# Patient Record
Sex: MALE | Race: WHITE | NOT HISPANIC OR LATINO | URBAN - METROPOLITAN AREA
[De-identification: names, ages, dates, MRNs, and addresses within clinical notes are randomized per-mention and may not be internally consistent; named-entity substitution may affect disease eponyms.]

---

## 2017-02-23 ENCOUNTER — INPATIENT (INPATIENT)
Facility: HOSPITAL | Age: 57
LOS: 0 days | Discharge: HOME | DRG: 74 | End: 2017-02-24
Attending: HOSPITALIST | Admitting: FAMILY MEDICINE
Payer: COMMERCIAL

## 2017-02-23 VITALS
HEIGHT: 68 IN | DIASTOLIC BLOOD PRESSURE: 99 MMHG | HEART RATE: 92 BPM | OXYGEN SATURATION: 98 % | WEIGHT: 184.97 LBS | RESPIRATION RATE: 20 BRPM | SYSTOLIC BLOOD PRESSURE: 166 MMHG | TEMPERATURE: 98 F

## 2017-02-23 DIAGNOSIS — I10 ESSENTIAL (PRIMARY) HYPERTENSION: ICD-10-CM

## 2017-02-23 DIAGNOSIS — G45.9 TRANSIENT CEREBRAL ISCHEMIC ATTACK, UNSPECIFIED: ICD-10-CM

## 2017-02-23 DIAGNOSIS — Z98.890 OTHER SPECIFIED POSTPROCEDURAL STATES: Chronic | ICD-10-CM

## 2017-02-23 DIAGNOSIS — E78.5 HYPERLIPIDEMIA, UNSPECIFIED: ICD-10-CM

## 2017-02-23 LAB
ALBUMIN SERPL ELPH-MCNC: 4.7 G/DL — SIGNIFICANT CHANGE UP (ref 3.3–5.2)
ALP SERPL-CCNC: 68 U/L — SIGNIFICANT CHANGE UP (ref 40–120)
ALT FLD-CCNC: 22 U/L — SIGNIFICANT CHANGE UP
ANION GAP SERPL CALC-SCNC: 19 MMOL/L — HIGH (ref 5–17)
ANISOCYTOSIS BLD QL: SLIGHT — SIGNIFICANT CHANGE UP
APPEARANCE UR: CLEAR — SIGNIFICANT CHANGE UP
APTT BLD: 32.1 SEC — SIGNIFICANT CHANGE UP (ref 27.5–37.4)
AST SERPL-CCNC: 27 U/L — SIGNIFICANT CHANGE UP
BASOPHILS NFR BLD AUTO: 1 % — SIGNIFICANT CHANGE UP (ref 0–2)
BILIRUB SERPL-MCNC: 0.7 MG/DL — SIGNIFICANT CHANGE UP (ref 0.4–2)
BILIRUB UR-MCNC: NEGATIVE — SIGNIFICANT CHANGE UP
BUN SERPL-MCNC: 21 MG/DL — HIGH (ref 8–20)
CALCIUM SERPL-MCNC: 10.4 MG/DL — HIGH (ref 8.6–10.2)
CHLORIDE SERPL-SCNC: 101 MMOL/L — SIGNIFICANT CHANGE UP (ref 98–107)
CO2 SERPL-SCNC: 21 MMOL/L — LOW (ref 22–29)
COLOR SPEC: SIGNIFICANT CHANGE UP
CREAT SERPL-MCNC: 1.2 MG/DL — SIGNIFICANT CHANGE UP (ref 0.5–1.3)
DIFF PNL FLD: NEGATIVE — SIGNIFICANT CHANGE UP
GLUCOSE SERPL-MCNC: 81 MG/DL — SIGNIFICANT CHANGE UP (ref 70–115)
GLUCOSE UR QL: NEGATIVE MG/DL — SIGNIFICANT CHANGE UP
HCT VFR BLD CALC: 39.2 % — LOW (ref 42–52)
HGB BLD-MCNC: 13.7 G/DL — LOW (ref 14–18)
INR BLD: 1.03 RATIO — SIGNIFICANT CHANGE UP (ref 0.88–1.16)
KETONES UR-MCNC: NEGATIVE — SIGNIFICANT CHANGE UP
LEUKOCYTE ESTERASE UR-ACNC: NEGATIVE — SIGNIFICANT CHANGE UP
LYMPHOCYTES # BLD AUTO: 17 % — LOW (ref 20–55)
MACROCYTES BLD QL: SLIGHT — SIGNIFICANT CHANGE UP
MCHC RBC-ENTMCNC: 20.3 PG — LOW (ref 27–31)
MCHC RBC-ENTMCNC: 34.9 G/DL — SIGNIFICANT CHANGE UP (ref 32–36)
MCV RBC AUTO: 58.2 FL — LOW (ref 80–94)
MICROCYTES BLD QL: SLIGHT — SIGNIFICANT CHANGE UP
MONOCYTES NFR BLD AUTO: 11 % — HIGH (ref 3–10)
NEUTROPHILS NFR BLD AUTO: 71 % — SIGNIFICANT CHANGE UP (ref 37–73)
NITRITE UR-MCNC: NEGATIVE — SIGNIFICANT CHANGE UP
PH UR: 5 — SIGNIFICANT CHANGE UP (ref 4.8–8)
PLAT MORPH BLD: NORMAL — SIGNIFICANT CHANGE UP
PLATELET # BLD AUTO: 317 K/UL — SIGNIFICANT CHANGE UP (ref 150–400)
POLYCHROMASIA BLD QL SMEAR: SLIGHT — SIGNIFICANT CHANGE UP
POTASSIUM SERPL-MCNC: 4.1 MMOL/L — SIGNIFICANT CHANGE UP (ref 3.5–5.3)
POTASSIUM SERPL-SCNC: 4.1 MMOL/L — SIGNIFICANT CHANGE UP (ref 3.5–5.3)
PROT SERPL-MCNC: 7.4 G/DL — SIGNIFICANT CHANGE UP (ref 6.6–8.7)
PROT UR-MCNC: NEGATIVE MG/DL — SIGNIFICANT CHANGE UP
PROTHROM AB SERPL-ACNC: 11.3 SEC — SIGNIFICANT CHANGE UP (ref 10–13.1)
RBC # BLD: 6.74 M/UL — HIGH (ref 4.6–6.2)
RBC # FLD: 18.3 % — HIGH (ref 11–15.6)
RBC BLD AUTO: ABNORMAL
SODIUM SERPL-SCNC: 141 MMOL/L — SIGNIFICANT CHANGE UP (ref 135–145)
SP GR SPEC: 1 — LOW (ref 1.01–1.02)
UROBILINOGEN FLD QL: NEGATIVE MG/DL — SIGNIFICANT CHANGE UP
WBC # BLD: 7.1 K/UL — SIGNIFICANT CHANGE UP (ref 4.8–10.8)
WBC # FLD AUTO: 7.1 K/UL — SIGNIFICANT CHANGE UP (ref 4.8–10.8)

## 2017-02-23 PROCEDURE — 70450 CT HEAD/BRAIN W/O DYE: CPT | Mod: 26

## 2017-02-23 PROCEDURE — 93010 ELECTROCARDIOGRAM REPORT: CPT

## 2017-02-23 PROCEDURE — 99285 EMERGENCY DEPT VISIT HI MDM: CPT

## 2017-02-23 RX ORDER — ASPIRIN/CALCIUM CARB/MAGNESIUM 324 MG
325 TABLET ORAL DAILY
Qty: 0 | Refills: 0 | Status: DISCONTINUED | OUTPATIENT
Start: 2017-02-23 | End: 2017-02-24

## 2017-02-23 RX ORDER — ENOXAPARIN SODIUM 100 MG/ML
40 INJECTION SUBCUTANEOUS EVERY 24 HOURS
Qty: 0 | Refills: 0 | Status: DISCONTINUED | OUTPATIENT
Start: 2017-02-23 | End: 2017-02-24

## 2017-02-23 RX ORDER — FENOFIBRATE,MICRONIZED 130 MG
145 CAPSULE ORAL DAILY
Qty: 0 | Refills: 0 | Status: DISCONTINUED | OUTPATIENT
Start: 2017-02-23 | End: 2017-02-24

## 2017-02-23 RX ORDER — ATORVASTATIN CALCIUM 80 MG/1
20 TABLET, FILM COATED ORAL AT BEDTIME
Qty: 0 | Refills: 0 | Status: DISCONTINUED | OUTPATIENT
Start: 2017-02-23 | End: 2017-02-24

## 2017-02-23 RX ORDER — AMLODIPINE BESYLATE 2.5 MG/1
5 TABLET ORAL DAILY
Qty: 0 | Refills: 0 | Status: DISCONTINUED | OUTPATIENT
Start: 2017-02-23 | End: 2017-02-24

## 2017-02-23 NOTE — H&P ADULT. - HISTORY OF PRESENT ILLNESS
57 y/o male presents after sudden onset of left facial numbness and left arm/leg numbness. Episode lasted around 3-5 min and spont. resolved. No associated palpitations, N/V, SOB, F/C. No recent injury. No motor weakness. No changes in medications. In ED symptoms resolved, w/u non-diagnostic. EKG normal. NIH 0.

## 2017-02-23 NOTE — ED PROVIDER NOTE - NS ED ROS FT
no fever  no chest pain  no SOB  no abd pain  no HA  + left sided numbness  All other ROS negative except as per HPI

## 2017-02-23 NOTE — H&P ADULT. - NEUROLOGICAL DETAILS
normal strength/deep reflexes intact/cranial nerves intact/no spontaneous movement/responds to verbal commands/superficial reflexes intact/alert and oriented x 3/sensation intact/responds to pain

## 2017-02-23 NOTE — H&P ADULT. - PROBLEM SELECTOR PLAN 1
Admit to CTU, Cont. Vasotec, Norvasc, Aspirin, statin, check lipid/A1c, Brain MRI, Carotid U/S, Echo, ambulate, DVT-P. Neuro eval

## 2017-02-23 NOTE — H&P ADULT. - FAMILY HISTORY
Father  Still living? Yes, Estimated age: Age Unknown  Family history of CVA, Age at diagnosis: 61-70

## 2017-02-23 NOTE — ED ADULT NURSE NOTE - OBJECTIVE STATEMENT
Patient reports @ 1430 sudden onset of left side facial numbness, b/l eye blurred vision, diaphoresis, tingling down left arm and left side without weakness. Patient reports symptoms lasted apporx 3-4 minutes and totally resolved. NIH 0 at time of assessment. Denies CP, sob. Awaits MD leon

## 2017-02-23 NOTE — ED ADULT TRIAGE NOTE - CHIEF COMPLAINT QUOTE
pt bib self c/o left side facial numbness and diaphoresis an hour ago. pt states that the numbness went away after 5 mins. pt states he was also dizzy. pt states right now he just doesn't fell "right." pt hx mitral valve repair last year.

## 2017-02-23 NOTE — ED PROVIDER NOTE - OBJECTIVE STATEMENT
CC:  numbness  Presenting symptoms: 55yo male with h/o HTN and hyperlipidemia with mitral valve replacement 1.5 yrs ago and had a post op episode of AFIB spontaneously resolved.  Patient does not taken any anticoagulants or aspirin  Pertinent Positives: numbness lasting 3 minutes and then resolving left face, left arm and left leg.  Pertinent Negatives: no CP, no HA, no SOB, no abd pain, no N/V/D  Timing: sudden just prior to arrival and resolved within 3 minutes.  Quality: numb  Radiation: none  Severity: moderate  Aggravating Factors: none  Relieving Factors: none

## 2017-02-23 NOTE — ED ADULT NURSE NOTE - PSH
H/O umbilical hernia repair    S/P arthroscopic surgery of left knee    S/P mitral valve repair    S/P tonsillectomy    S/P tonsillectomy

## 2017-02-24 VITALS
TEMPERATURE: 98 F | SYSTOLIC BLOOD PRESSURE: 126 MMHG | OXYGEN SATURATION: 98 % | HEART RATE: 80 BPM | DIASTOLIC BLOOD PRESSURE: 79 MMHG | RESPIRATION RATE: 18 BRPM

## 2017-02-24 DIAGNOSIS — G45.9 TRANSIENT CEREBRAL ISCHEMIC ATTACK, UNSPECIFIED: ICD-10-CM

## 2017-02-24 LAB
HBA1C BLD-MCNC: 5.3 % — SIGNIFICANT CHANGE UP (ref 4–5.6)
TSH SERPL-MCNC: 2.75 UIU/ML — SIGNIFICANT CHANGE UP (ref 0.27–4.2)

## 2017-02-24 PROCEDURE — 81003 URINALYSIS AUTO W/O SCOPE: CPT

## 2017-02-24 PROCEDURE — 70551 MRI BRAIN STEM W/O DYE: CPT | Mod: 26

## 2017-02-24 PROCEDURE — 70450 CT HEAD/BRAIN W/O DYE: CPT

## 2017-02-24 PROCEDURE — 93005 ELECTROCARDIOGRAM TRACING: CPT

## 2017-02-24 PROCEDURE — 80061 LIPID PANEL: CPT

## 2017-02-24 PROCEDURE — 85610 PROTHROMBIN TIME: CPT

## 2017-02-24 PROCEDURE — 99285 EMERGENCY DEPT VISIT HI MDM: CPT | Mod: 25

## 2017-02-24 PROCEDURE — 93306 TTE W/DOPPLER COMPLETE: CPT

## 2017-02-24 PROCEDURE — G0378: CPT

## 2017-02-24 PROCEDURE — 83036 HEMOGLOBIN GLYCOSYLATED A1C: CPT

## 2017-02-24 PROCEDURE — 84443 ASSAY THYROID STIM HORMONE: CPT

## 2017-02-24 PROCEDURE — 36415 COLL VENOUS BLD VENIPUNCTURE: CPT

## 2017-02-24 PROCEDURE — 85730 THROMBOPLASTIN TIME PARTIAL: CPT

## 2017-02-24 PROCEDURE — 80053 COMPREHEN METABOLIC PANEL: CPT

## 2017-02-24 PROCEDURE — 70551 MRI BRAIN STEM W/O DYE: CPT

## 2017-02-24 PROCEDURE — 99239 HOSP IP/OBS DSCHRG MGMT >30: CPT

## 2017-02-24 PROCEDURE — 70544 MR ANGIOGRAPHY HEAD W/O DYE: CPT

## 2017-02-24 PROCEDURE — 85027 COMPLETE CBC AUTOMATED: CPT

## 2017-02-24 PROCEDURE — 93880 EXTRACRANIAL BILAT STUDY: CPT | Mod: 26

## 2017-02-24 PROCEDURE — 93880 EXTRACRANIAL BILAT STUDY: CPT

## 2017-02-24 RX ORDER — CYCLOBENZAPRINE HYDROCHLORIDE 10 MG/1
1 TABLET, FILM COATED ORAL
Qty: 10 | Refills: 0 | OUTPATIENT
Start: 2017-02-24 | End: 2017-03-01

## 2017-02-24 RX ORDER — ATORVASTATIN CALCIUM 80 MG/1
2 TABLET, FILM COATED ORAL
Qty: 0 | Refills: 0 | COMMUNITY

## 2017-02-24 RX ORDER — LANOLIN ALCOHOL/MO/W.PET/CERES
9 CREAM (GRAM) TOPICAL ONCE
Qty: 0 | Refills: 0 | Status: COMPLETED | OUTPATIENT
Start: 2017-02-24 | End: 2017-02-24

## 2017-02-24 RX ORDER — ASPIRIN/CALCIUM CARB/MAGNESIUM 324 MG
1 TABLET ORAL
Qty: 30 | Refills: 0 | OUTPATIENT
Start: 2017-02-24 | End: 2017-03-26

## 2017-02-24 RX ADMIN — Medication 325 MILLIGRAM(S): at 12:33

## 2017-02-24 RX ADMIN — AMLODIPINE BESYLATE 5 MILLIGRAM(S): 2.5 TABLET ORAL at 05:36

## 2017-02-24 RX ADMIN — Medication 145 MILLIGRAM(S): at 12:33

## 2017-02-24 RX ADMIN — Medication 10 MILLIGRAM(S): at 05:36

## 2017-02-24 NOTE — ED ADULT NURSE REASSESSMENT NOTE - NS ED NURSE REASSESS COMMENT FT1
Pt ambulating with steady gate to bathroom. Pt requesting melatonin 10mg to help sleep. Dr. Plata informed. As per MD orders, pt medicated.
Pt resting comfortably in bed, in no apparent distress. Pt easily arrousable.  Pt on monitor, awaiting bed. Will continue to monitor.
Pt resting comfortably in bed, on monitor. Pt denies any pain or discomfort. Pt awaiting bed, will continue to monitor.
Received pt from day RN. Pt awake and alert x3, resting comfortably in bed with family at bedside. pt denies any pain or discomfort. Pt denies any chest pain or SOB. Pt denies any left sided facial and arm numbness or tingling since initial episode. Pt awaiting CT, will continue to monitor.

## 2017-02-24 NOTE — DISCHARGE NOTE ADULT - PATIENT PORTAL LINK FT
“You can access the [facility] Patient Portal, offered by [facility], by registering with the following website: [email or web address]”

## 2017-02-24 NOTE — DISCHARGE NOTE ADULT - CARE PLAN
Principal Discharge DX:	Numbness and tingling  Goal:	likely secondary to cervical radiculopathy  Instructions for follow-up, activity and diet:	follow up with pcp   flexeril prn  Secondary Diagnosis:	Hyperlipidemia  Goal:	statin, fenofibrate  Instructions for follow-up, activity and diet:	advised to inc tuna and salmon  Secondary Diagnosis:	Hypertension  Instructions for follow-up, activity and diet:	home meds  Secondary Diagnosis:	TIA (transient ischemic attack)  Goal:	ruled out Principal Discharge DX:	Numbness and tingling  Goal:	likely secondary to cervical radiculopathy  Instructions for follow-up, activity and diet:	follow up with pcp   flexeril prn  Secondary Diagnosis:	Hyperlipidemia  Goal:	statin, fenofibrate  Instructions for follow-up, activity and diet:	advised to inc tuna and salmon  Secondary Diagnosis:	Hypertension  Instructions for follow-up, activity and diet:	home meds  Secondary Diagnosis:	TIA (transient ischemic attack)  Goal:	ruled out, unlikley

## 2017-02-24 NOTE — DISCHARGE NOTE ADULT - PLAN OF CARE
likely secondary to cervical radiculopathy follow up with pcp   flexeril prn statin, fenofibrate advised to inc tuna and salmon home meds ruled out ruled out, pkley

## 2017-02-24 NOTE — CONSULT NOTE ADULT - SUBJECTIVE AND OBJECTIVE BOX
HPI:  55 y/o male admitted  after sudden onset of left facial numbness and left arm/leg numbness. per patient was standing for long time and felt bluured vision which he thought was due to computer work, numbness was mostly  left cheek, around 3-5 min and spontaneously . resolved. No associated palpitations, N/V, SOB, F/C. No recent injury. No motor weakness. No changes in medications. In ED symptoms resolved, w/u non-diagnostic. EKG normal. NIH 0. (2017 22:57)      PAST MEDICAL & SURGICAL HISTORY:  Diverticulitis  Thalassemia minor  Mitral regurgitation  Hypertension  Hyperlipidemia  S/P mitral valve repair  S/P tonsillectomy  H/O umbilical hernia repair  S/P tonsillectomy  S/P arthroscopic surgery of left knee      REVIEW OF SYSTEMS:    CONSTITUTIONAL: No fever, weight loss, or fatigue  EYES: No eye pain, visual disturbances, or discharge  ENMT:  No difficulty hearing, tinnitus, vertigo; No sinus or throat pain  NECK: No pain or stiffness  RESPIRATORY: No cough, wheezing, chills or hemoptysis; No shortness of breath  CARDIOVASCULAR: No chest pain, palpitations, dizziness, or leg swelling  GASTROINTESTINAL: No abdominal or epigastric pain. No nausea, vomiting, or hematemesis; No diarrhea or constipation. No melena or hematochezia.  GENITOURINARY: No dysuria, frequency, hematuria, or incontinence  NEUROLOGICAL: No headaches, memory loss, loss of strength, numbness, or tremors  SKIN: No itching, burning, rashes, or lesions   LYMPH NODES: No enlarged glands  ENDOCRINE: No heat or cold intolerance; No hair loss  MUSCULOSKELETAL:postive right mid neck pain for 1 year.   PSYCHIATRIC: No depression, anxiety, mood swings, or difficulty sleeping  HEME/LYMPH: No easy bruising, or bleeding gums    MEDICATIONS  (STANDING):  enoxaparin Injectable 40milliGRAM(s) SubCutaneous every 24 hours  amLODIPine   Tablet 5milliGRAM(s) Oral daily  enalapril 10milliGRAM(s) Oral daily  hydrochlorothiazide   Tablet 25milliGRAM(s) Oral daily  aspirin enteric coated 325milliGRAM(s) Oral daily  atorvastatin 20milliGRAM(s) Oral at bedtime  fenofibrate Tablet 145milliGRAM(s) Oral daily    MEDICATIONS  (PRN):      Allergies    No Known Allergies    Intolerances        SOCIAL HISTORY: Denies smoking, uses alcohol socially    FAMILY HISTORY:  Family history of CVA      PHYSICAL EXAM:  Vital Signs Last 24 Hrs  T(F): 97.7  HR: 73  BP: 119/90  RR: 20  Wt(kg): --    GENERAL: NAD, well-groomed, well-developed  HEAD:  Atraumatic, Normocephalic  EYES: EOMI, PERRLA, conjunctiva and sclera clear  NECK: Supple, No JVD, Normal thyroid, no carotid bruit bilateral  NERVOUS SYSTEM:  Alert & Oriented X3, speech and language normal, cranial nerves II-XII normal,   Good concentration; Motor Strength 5/5 B/L upper and lower extremities; DTRs 2+ intact and symmetric, plantar responses flexor bilaterally, sensory exam normal to light touch, pin prick and position sense, stance and gait normal, no dysmetri bilaterally  HEART: Regular rate and rhythm; No murmurs, rubs, or gallops          LABS:                        13.7   7.1   )-----------( 317      ( 2017 18:51 )             39.2     2017 18:51    141    |  101    |  21.0   ----------------------------<  81     4.1     |  21.0   |  1.20     Ca    10.4       2017 18:51    TPro  7.4    /  Alb  4.7    /  TBili  0.7    /  DBili  x      /  AST  27     /  ALT  22     /  AlkPhos  68     2017 18:51    PT/INR - ( 2017 21:32 )   PT: 11.3 sec;   INR: 1.03 ratio         PTT - ( 2017 21:32 )  PTT:32.1 sec  Urinalysis Basic - ( 2017 21:31 )    Color: Pale Yellow / Appearance: Clear / S.005 / pH: x  Gluc: x / Ketone: Negative  / Bili: Negative / Urobili: Negative mg/dL   Blood: x / Protein: Negative mg/dL / Nitrite: Negative   Leuk Esterase: Negative / RBC: x / WBC x   Sq Epi: x / Non Sq Epi: x / Bacteria: x        RADIOLOGY & ADDITIONAL STUDIES:  Head ct negative.

## 2017-02-24 NOTE — DISCHARGE NOTE ADULT - MEDICATION SUMMARY - MEDICATIONS TO CHANGE
I will SWITCH the dose or number of times a day I take the medications listed below when I get home from the hospital:  None I will SWITCH the dose or number of times a day I take the medications listed below when I get home from the hospital:    Lipitor 20 mg oral tablet  -- 1 tab(s) by mouth once a day (at bedtime)

## 2017-02-24 NOTE — DISCHARGE NOTE ADULT - CARE PROVIDER_API CALL
Huseyin Tamayo), Family Medicine  [address] [place] [address]  Phone: [phone]  Fax: [phone]

## 2017-02-24 NOTE — DISCHARGE NOTE ADULT - HOSPITAL COURSE
57 y/o male presents after sudden onset of left facial numbness and left arm/leg numbness. Episode lasted around 3-5 min and spontaneously. resolved. No associated palpitations, N/V, SOB, F/C. No recent injury. No motor weakness. No changes in medications. In ED symptoms resolved, w/u non-diagnostic. EKG normal. NIH 0.     patient had ct head which was negative  started on asa and statin. patient had recent tte and carotids as outpatient thus those tests were cancelled.   mri head was done which was negative and likely diagnosis was cervical radiculopathy. 57 y/o male presents after sudden onset of left facial numbness and left arm/leg numbness. Episode lasted around 3-5 min and spontaneously. resolved. No associated palpitations, N/V, SOB, F/C. No recent injury. No motor weakness. No changes in medications. In ED symptoms resolved, w/u non-diagnostic. EKG normal. NIH 0.     patient had ct head which was negative  started on asa and statin. patient had recent tte and carotids as outpatient thus those tests were cancelled.   mri/MRA head was done:     Impression: Subcentimeter areas of susceptibility in the posterior fossa   and supratentorial region as described above.    patient told to follow up with outpatient imaging. patient is stable for dc with increased dose of lipitor to 40mg     time spent on dc 35 minutes

## 2017-02-24 NOTE — DISCHARGE NOTE ADULT - MEDICATION SUMMARY - MEDICATIONS TO TAKE
I will START or STAY ON the medications listed below when I get home from the hospital:    aspirin 325 mg oral delayed release tablet  -- 1 tab(s) by mouth once a day  -- Indication: For Heart health    fenofibrate 160 mg oral tablet  -- 1 tab(s) by mouth once a day  -- Indication: For Hyperlipidemia, unspecified hyperlipidemia type    Lipitor 20 mg oral tablet  -- 1 tab(s) by mouth once a day (at bedtime)  -- Indication: For Hyperlipidemia, unspecified hyperlipidemia type    enalapril-hydrochlorothiazide 10 mg-25 mg oral tablet  -- 1 tab(s) by mouth once a day  -- Indication: For Htn    amLODIPine 5 mg oral tablet  -- 1 tab(s) by mouth once a day  -- Indication: For Htn    cyclobenzaprine 5 mg oral tablet  -- 1 tab(s) by mouth 2 times a day, As Needed MDD:max 2 tabs  -- Some non-prescription drugs may aggravate your condition.  Read all labels carefully.  If a warning appears, check with your doctor before taking.  This drug may impair the ability to drive or operate machinery.  Use care until you become familiar with its effects.    -- Indication: For muscle spasms I will START or STAY ON the medications listed below when I get home from the hospital:    aspirin 325 mg oral delayed release tablet  -- 1 tab(s) by mouth once a day  -- Indication: For Heart health    fenofibrate 160 mg oral tablet  -- 1 tab(s) by mouth once a day  -- Indication: For Hyperlipidemia, unspecified hyperlipidemia type    Lipitor 20 mg oral tablet  -- 2 tab(s) by mouth once a day (at bedtime)  -- Indication: For Hyperlipidemia    enalapril-hydrochlorothiazide 10 mg-25 mg oral tablet  -- 1 tab(s) by mouth once a day  -- Indication: For Htn    amLODIPine 5 mg oral tablet  -- 1 tab(s) by mouth once a day  -- Indication: For Htn    cyclobenzaprine 5 mg oral tablet  -- 1 tab(s) by mouth 2 times a day, As Needed MDD:max 2 tabs  -- Some non-prescription drugs may aggravate your condition.  Read all labels carefully.  If a warning appears, check with your doctor before taking.  This drug may impair the ability to drive or operate machinery.  Use care until you become familiar with its effects.    -- Indication: For muscle spasms

## 2019-11-07 NOTE — ED ADULT NURSE REASSESSMENT NOTE - GLASGOW COMA SCALE: BEST VERBAL RESPONSE
Immediate Post-Op Evalulation


Immediate Post-Op Evalulation


Procedure:  EGD


Date of Evaluation:  Nov 6, 2019


Time of Evaluation:  12:25


IV Fluids:  200


Blood Products:  none


Estimated Blood Loss:  none


Urinary Output:  none


Blood Pressure Systolic:  116


Blood Pressure Diastolic:  72


Pulse Rate:  72


Respiratory Rate:  16


O2 Sat by Pulse Oximetry:  98


Temperature (Fahrenheit):  97.6


Pain Score (1-10):  1


Nausea:  No


Vomiting:  No


Complications


none


Patient Status:  no response, ventilated, none


Hydration Status:  adequate











Gregory Guo MD Nov 7, 2019 10:09 (V5) oriented

## 2021-05-19 ENCOUNTER — APPOINTMENT (OUTPATIENT)
Dept: GASTROENTEROLOGY | Facility: CLINIC | Age: 61
End: 2021-05-19
